# Patient Record
Sex: FEMALE | Race: ASIAN | NOT HISPANIC OR LATINO | URBAN - METROPOLITAN AREA
[De-identification: names, ages, dates, MRNs, and addresses within clinical notes are randomized per-mention and may not be internally consistent; named-entity substitution may affect disease eponyms.]

---

## 2019-02-23 ENCOUNTER — WALK IN (OUTPATIENT)
Dept: URGENT CARE | Age: 39
End: 2019-02-23

## 2019-02-23 ENCOUNTER — IMAGING SERVICES (OUTPATIENT)
Dept: GENERAL RADIOLOGY | Age: 39
End: 2019-02-23
Attending: FAMILY MEDICINE

## 2019-02-23 DIAGNOSIS — M25.551 PAIN OF RIGHT HIP JOINT: Primary | ICD-10-CM

## 2019-02-23 DIAGNOSIS — M25.551 PAIN OF RIGHT HIP JOINT: ICD-10-CM

## 2019-02-23 PROCEDURE — 73502 X-RAY EXAM HIP UNI 2-3 VIEWS: CPT | Performed by: RADIOLOGY

## 2019-02-23 PROCEDURE — 99204 OFFICE O/P NEW MOD 45 MIN: CPT | Performed by: FAMILY MEDICINE

## 2019-02-23 ASSESSMENT — PAIN SCALES - GENERAL: PAINLEVEL: 7-8

## 2019-05-25 ENCOUNTER — OFFICE VISIT (OUTPATIENT)
Dept: OBGYN CLINIC | Facility: CLINIC | Age: 39
End: 2019-05-25
Payer: COMMERCIAL

## 2019-05-25 VITALS
WEIGHT: 135 LBS | RESPIRATION RATE: 14 BRPM | BODY MASS INDEX: 23.05 KG/M2 | SYSTOLIC BLOOD PRESSURE: 92 MMHG | DIASTOLIC BLOOD PRESSURE: 60 MMHG | HEART RATE: 70 BPM | HEIGHT: 64 IN | TEMPERATURE: 98 F

## 2019-05-25 DIAGNOSIS — Z98.890 STATUS POST ELECTIVE ABORTION: ICD-10-CM

## 2019-05-25 DIAGNOSIS — N64.4 BREAST PAIN, RIGHT: ICD-10-CM

## 2019-05-25 DIAGNOSIS — R14.0 ABDOMINAL BLOATING WITH CRAMPS: ICD-10-CM

## 2019-05-25 DIAGNOSIS — Z11.3 SCREEN FOR SEXUALLY TRANSMITTED DISEASES: ICD-10-CM

## 2019-05-25 DIAGNOSIS — R10.9 ABDOMINAL BLOATING WITH CRAMPS: ICD-10-CM

## 2019-05-25 DIAGNOSIS — N64.4 BREAST TENDERNESS IN FEMALE: ICD-10-CM

## 2019-05-25 DIAGNOSIS — N94.89 UTERINE CRAMPING: ICD-10-CM

## 2019-05-25 DIAGNOSIS — R10.2 SUPRAPUBIC PRESSURE: ICD-10-CM

## 2019-05-25 DIAGNOSIS — N92.6 IRREGULAR BLEEDING: Primary | ICD-10-CM

## 2019-05-25 PROCEDURE — 87491 CHLMYD TRACH DNA AMP PROBE: CPT | Performed by: NURSE PRACTITIONER

## 2019-05-25 PROCEDURE — 99204 OFFICE O/P NEW MOD 45 MIN: CPT | Performed by: NURSE PRACTITIONER

## 2019-05-25 PROCEDURE — 81002 URINALYSIS NONAUTO W/O SCOPE: CPT | Performed by: NURSE PRACTITIONER

## 2019-05-25 PROCEDURE — 87660 TRICHOMONAS VAGIN DIR PROBE: CPT | Performed by: NURSE PRACTITIONER

## 2019-05-25 PROCEDURE — 87147 CULTURE TYPE IMMUNOLOGIC: CPT | Performed by: NURSE PRACTITIONER

## 2019-05-25 PROCEDURE — 87510 GARDNER VAG DNA DIR PROBE: CPT | Performed by: NURSE PRACTITIONER

## 2019-05-25 PROCEDURE — 87591 N.GONORRHOEAE DNA AMP PROB: CPT | Performed by: NURSE PRACTITIONER

## 2019-05-25 PROCEDURE — 87077 CULTURE AEROBIC IDENTIFY: CPT | Performed by: NURSE PRACTITIONER

## 2019-05-25 PROCEDURE — 87086 URINE CULTURE/COLONY COUNT: CPT | Performed by: NURSE PRACTITIONER

## 2019-05-25 PROCEDURE — 87480 CANDIDA DNA DIR PROBE: CPT | Performed by: NURSE PRACTITIONER

## 2019-05-25 PROCEDURE — 81025 URINE PREGNANCY TEST: CPT | Performed by: NURSE PRACTITIONER

## 2019-05-25 RX ORDER — MULTIVIT-MIN/IRON FUM/FOLIC AC 7.5 MG-4
1 TABLET ORAL DAILY
COMMUNITY
End: 2019-08-15

## 2019-05-25 NOTE — PROGRESS NOTES
Past 5 months irregular mense since after EAB. Cramping bloating,constipation, breast tenderness. Spouse had vasecetomy. Unsure if after testing was completed.

## 2019-05-25 NOTE — PROGRESS NOTES
GYN H&P     2019  11:51 AM    CC: Patient is here to establish care/pelvic cramping    HPI: Patient is a 35year old  here. Reports she had an EAB (surgical) 5 months ago.  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 Irregular bleeding    2. Suprapubic pressure    3. Breast tenderness in female    4. Breast pain, right    5. Status post elective     6. Uterine cramping    7. Abdominal bloating with cramps    8.  Screen for sexually transmitted diseas

## 2019-05-31 RX ORDER — AMOXICILLIN 500 MG/1
500 CAPSULE ORAL 3 TIMES DAILY
Qty: 21 CAPSULE | Refills: 0 | Status: SHIPPED | OUTPATIENT
Start: 2019-05-31 | End: 2019-06-07

## 2019-08-01 ENCOUNTER — OFFICE VISIT (OUTPATIENT)
Dept: OBGYN CLINIC | Facility: CLINIC | Age: 39
End: 2019-08-01
Payer: COMMERCIAL

## 2019-08-01 VITALS
BODY MASS INDEX: 22.71 KG/M2 | HEIGHT: 64 IN | WEIGHT: 133 LBS | SYSTOLIC BLOOD PRESSURE: 118 MMHG | HEART RATE: 88 BPM | DIASTOLIC BLOOD PRESSURE: 60 MMHG

## 2019-08-01 DIAGNOSIS — Z12.4 PAPANICOLAOU SMEAR FOR CERVICAL CANCER SCREENING: ICD-10-CM

## 2019-08-01 DIAGNOSIS — Z12.31 ENCOUNTER FOR SCREENING MAMMOGRAM FOR MALIGNANT NEOPLASM OF BREAST: ICD-10-CM

## 2019-08-01 DIAGNOSIS — Z01.419 WELL WOMAN EXAM WITH ROUTINE GYNECOLOGICAL EXAM: Primary | ICD-10-CM

## 2019-08-01 PROCEDURE — 88175 CYTOPATH C/V AUTO FLUID REDO: CPT | Performed by: OBSTETRICS & GYNECOLOGY

## 2019-08-01 PROCEDURE — 99396 PREV VISIT EST AGE 40-64: CPT | Performed by: OBSTETRICS & GYNECOLOGY

## 2019-08-01 NOTE — PROGRESS NOTES
Leny Santacruz is a 35year old female  Patient's last menstrual period was 2019 (exact date). Patient presents with:  Wellness Visit: family planning  .      Got it pregnant in February had a termination in December she says she was 8 we control/protection: Vasectomy    Lifestyle      Physical activity:        Days per week: Not on file        Minutes per session: Not on file      Stress: Not on file    Relationships      Social connections:        Talks on phone: Not on file        Gets t denies dysuria, incontinence, abnormal vaginal discharge, vaginal itching  Skin/Breast:  Denies any breast pain, lumps, or discharge. Neurological:  denies headaches, extremity weakness or numbness.       PHYSICAL EXAM:   Constitutional: well developed, w

## 2019-08-05 LAB — LAST PAP RESULT: NORMAL

## 2019-08-07 ENCOUNTER — TELEPHONE (OUTPATIENT)
Dept: OBGYN CLINIC | Facility: CLINIC | Age: 39
End: 2019-08-07

## 2019-08-07 NOTE — TELEPHONE ENCOUNTER
----- Message from Marcia Carvalho sent at 8/7/2019  4:55 PM CDT -----  Returning call re:test results

## 2019-08-08 NOTE — TELEPHONE ENCOUNTER
----- Message from [facility] sent at 8/8/2019  7:49 AM CDT -----  Pt called back (answering service) for test results

## 2019-08-08 NOTE — TELEPHONE ENCOUNTER
Patient aware of normal pap smear results. She was just in for  her Well Woman Visit on 8/1/19. States that she  desires to get pregnant and wants to know if due to her age doctor would put her on something to make it easier to get pregnant.   Patient inform

## 2019-08-08 NOTE — TELEPHONE ENCOUNTER
----- Message from [facility] sent at 8/8/2019  7:49 AM CDT -----  Pt called back (answering service) for test results  See notes for phone encounter 8/7/19  Patient aware of normal pap results but asking if she could  take medication help her get pregn

## 2019-08-08 NOTE — TELEPHONE ENCOUNTER
----- Message from [facility] sent at 8/8/2019  7:49 AM CDT -----  Pt called back (answering service) for test results

## 2019-08-15 ENCOUNTER — OFFICE VISIT (OUTPATIENT)
Dept: OBGYN CLINIC | Facility: CLINIC | Age: 39
End: 2019-08-15
Payer: COMMERCIAL

## 2019-08-15 VITALS
BODY MASS INDEX: 22.53 KG/M2 | SYSTOLIC BLOOD PRESSURE: 102 MMHG | TEMPERATURE: 99 F | HEIGHT: 64 IN | WEIGHT: 132 LBS | DIASTOLIC BLOOD PRESSURE: 60 MMHG

## 2019-08-15 DIAGNOSIS — Z72.51 UNPROTECTED SEXUAL INTERCOURSE: ICD-10-CM

## 2019-08-15 DIAGNOSIS — R10.2 PELVIC CRAMPING: ICD-10-CM

## 2019-08-15 DIAGNOSIS — R52 BODY ACHES: ICD-10-CM

## 2019-08-15 DIAGNOSIS — R35.0 URINARY FREQUENCY: Primary | ICD-10-CM

## 2019-08-15 DIAGNOSIS — M54.50 ACUTE MIDLINE LOW BACK PAIN WITHOUT SCIATICA: ICD-10-CM

## 2019-08-15 PROBLEM — Z01.419 WELL WOMAN EXAM WITH ROUTINE GYNECOLOGICAL EXAM: Status: RESOLVED | Noted: 2019-08-01 | Resolved: 2019-08-15

## 2019-08-15 LAB
APPEARANCE: CLEAR
CONTROL LINE PRESENT WITH A CLEAR BACKGROUND (YES/NO): YES YES/NO
MULTISTIX LOT#: NORMAL NUMERIC
PH, URINE: 7 (ref 4.5–8)
PREGNANCY TEST, URINE: NEGATIVE
SPECIFIC GRAVITY: 1.02 (ref 1–1.03)
URINE-COLOR: YELLOW
UROBILINOGEN,SEMI-QN: 0.2 MG/DL (ref 0–1.9)

## 2019-08-15 PROCEDURE — 87086 URINE CULTURE/COLONY COUNT: CPT | Performed by: PHYSICIAN ASSISTANT

## 2019-08-15 PROCEDURE — 81025 URINE PREGNANCY TEST: CPT | Performed by: OBSTETRICS & GYNECOLOGY

## 2019-08-15 PROCEDURE — 99214 OFFICE O/P EST MOD 30 MIN: CPT | Performed by: OBSTETRICS & GYNECOLOGY

## 2019-08-15 PROCEDURE — 81002 URINALYSIS NONAUTO W/O SCOPE: CPT | Performed by: OBSTETRICS & GYNECOLOGY

## 2019-08-15 NOTE — PROGRESS NOTES
[facility]  Obstetrics and Gynecology   History & Physical    Chief complaint: Patient presents with:  Gyn Problem: cramping, body aches, body chills, pelvic pain mainly on RT side, No burning senation, frequent urination      Subjective:     HPI last menstrual period was 07/26/2019 (exact date). Meds:    Current Outpatient Medications on File Prior to Visit:  Prenatal Vit-Fe Sulfate-FA (PRENATAL VITAMIN OR) Take by mouth.  Disp:  Rfl:      No current facility-administered medications on file sherita Concerns:         Service: Not Asked        Blood Transfusions: Not Asked        Caffeine Concern: No        Occupational Exposure: Not Asked        Hobby Hazards: Not Asked        Sleep Concern: Not Asked        Stress Concern: Not Asked        We Conjunctivae and EOM are normal.   Cardiovascular: Normal rate and regular rhythm. No murmur heard. Pulmonary/Chest: Breath sounds normal. She is in respiratory distress. Abdominal: Soft. She exhibits no distension and no mass.  There is no tenderness Protein Urine      Negative/Trace mg/dL  neg   Urobilinogen Urine      0.0 - 1.9 mg/dL  0.2   Nitrite Urine      Negative  neg   Leukocyte Esterase Urine      Negative  trace   APPEARANCE      Clear  clear   Color Urine      Yellow  yellow   Multistix Lo the ruptured ovarian cyst. Can recheck UCG in about 2-3 weeks for reassurance.    Will check pelvic US  RTC PRN    Leroy Oconnell MD  [facility] - OBGYN

## 2019-09-13 ENCOUNTER — TELEPHONE (OUTPATIENT)
Dept: OBGYN CLINIC | Facility: CLINIC | Age: 39
End: 2019-09-13

## 2019-09-18 NOTE — TELEPHONE ENCOUNTER
Patient is looking for advise on next step, she is trying to conceive. Patient instructed to schedule appointment with provider for consultation. Patient verbalizes understanding.

## 2020-01-30 ENCOUNTER — HOSPITAL (OUTPATIENT)
Dept: OTHER | Age: 40
End: 2020-01-30
Attending: OBSTETRICS & GYNECOLOGY

## 2020-01-30 PROCEDURE — X1094 NO CHARGE VISIT: HCPCS | Performed by: OBSTETRICS & GYNECOLOGY

## 2020-01-30 PROCEDURE — 76811 OB US DETAILED SNGL FETUS: CPT | Performed by: OBSTETRICS & GYNECOLOGY

## 2021-05-26 VITALS
HEART RATE: 80 BPM | RESPIRATION RATE: 16 BRPM | SYSTOLIC BLOOD PRESSURE: 90 MMHG | TEMPERATURE: 99.4 F | DIASTOLIC BLOOD PRESSURE: 50 MMHG

## 2022-10-31 ENCOUNTER — TELEPHONE (OUTPATIENT)
Dept: UROLOGY | Facility: CLINIC | Age: 42
End: 2022-10-31

## 2023-04-07 NOTE — PROGRESS NOTES
MUSCULOSKELETAL AND PELVIC FLOOR EVALUATION:     Diagnosis:   Stress incontinence (N39.3)  Urge urinary incontinence (N39.41)  Pelvic floor weakness (L79.83)     Referring Provider: Pallavi Maldonado  Date of Evaluation:    2023    Precautions:  None Next MD visit:   none scheduled  Date of Surgery: n/a     PATIENT SUMMARY   Clement Lee is a 36year old female who presents to therapy today with complaints of urinary inconinence after giving birth to her youngest child ~3 years ago. She had her first child 21 years ago and did not reportedly experience any issues afterwards; however, her youngest was born 2020 and she started experiencing her symptoms shortly after. She has noticed some improvement in leakage since giving birth, but it still occurs with coughing, sneezing, laughing, jumping and running. She reports that she can no longer comfortably participate in fitness classes due to leakage. She reports using 2+ pads per day. She also reports more recent onset of urge incontinence. She often experiences urgency and then leaks before she is able to make it to the restroom. Reports drinking 32-44 ounces of water per day. Reports urinating 1x every two hours in the day and usually 0x at night. Denies issues with constipation or straining; reports going 1x/day. Current symptoms include: urgency/frequency and leakage    Pt describes pain level: current 0/10, best 0/10, worst 0/10. 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 PFDI-20: Not completed    Sobia Thibodeaux describes prior level of function as having no leakage. Pt goals include to resolve the urinary leakage so she can return to fitness classes. Past medical history was reviewed with Mary. She  has no past medical history on file.       URINARY HABITS  Types of symptoms: stress incontinence and urge incontinence  Events associated with the onset of urinary complaints: childbirth  Abdominal/Vaginal Pressure complaints: no  Urinary Frequency: 1x every 2 hours  Urine Stream: WNL  Amount: normal to diminished  Leaking occurs: with coughing, sneezing, laughing, jumping, running  Episodes of Leakage: 2+ times per day  Amount of leakage: min to mod  Pad use: yes  Pad Change frequency: 2-3x/day  Nocturia: no  Fluid Intake: variable  Bladder irritants: NA  Urine Stop test: unable  Post void dribble: no  Hovering: sometimes  Empty bladder just in case: yes  Do you ever leak urine without knowing it? sometimes    BOWEL HABITS  Types of symptoms: other WNL   Frequency of bowel movements: WNL  Stool consistency: [place] Stool Scale: 4  Do you strain with defecation: No   Laxative use: No    SEXUAL HEALTH STATUS  Marinoff Scale: 0  History of Sexual Abuse: NA  Sexual [place] Status: active  Pain with initial and/or deep penetration: no  Pain with pelvic exam/tampon use: no    ASSESSMENT  Betsy Morse presents to physical therapy evaluation with primary c/o urinary leakage. The results of the objective tests and measures show impaired TvA recruitment, suboptimal LE strength, absent involuntary PFM contraction ability/cough reflex, and markedly impaired PFM contraction ability as only trace volitional contraction was observed with PERF score. Functional deficits include but are not limited to impaired QoL and ability to engage in childcare tasks, exercise, and higher impact activities due to leakage. Signs and symptoms are consistent with diagnosis of mixed urinary incontinence. Pt and PT discussed evaluation findings, pathology, POC and HEP. Pt voiced understanding and performs HEP correctly without reported pain. Skilled Pelvic Physical Therapy is medically necessary to address the above impairments and reach functional goals. OBJECTIVE:   Posture:  WNL  Pelvic Alignment: WNL  Deep Tendon Reflexes:  Patella: R 2+, L 2+;        Achilles: R 2+, L 2+  Gait: pt ambulates on level ground with normal mechanics. External Palpation: unremarkable  Scars (location/surgery): NA  Abdominal Wall Integrity: decreased tone    Range Of Motion  Lumbar AROM screen: WNL  LE AROM screen: grossly WNL     Strength (MMT) 5/5 GASTON LE except 3+/5 hip extensors  Transverse Abdominis: 3/5 -- cues required for proper activation     Flexibility Summary: WNL GASTON LE      Informed consent for internal pelvic evaluation given: Yes    External Observation:   Voluntary contraction: absent   Voluntary relaxation: present  Involuntary contraction: absent  Involuntary relaxation: present    Mons pubis: WNL  Labia majora: WNL  Labia minora: WNL  Urethral meatus: WNL  Introitus: WNL  Perineal body: WNL      Internal Examination     Pelvic Floor Muscle strength: (PERF= Power/Endurance/Reps/Fast) MMT: 1/1/1/1 - only trace contraction ability   External Anal Sphincter: NT  Accessory Muscle Use: gluteals, adductors, significant    Tissue Laxity Test:  Anterior Wall: WNL  Posterior Wall: WNL  Apical: WNL    Eccentric lengthening contraction: impaired  Bearing down Valsalva maneuver (2-3x): WNL    Internal Palpation: WNL     Cough reflex: impaired     Today's Treatment and Response:   Patient education was provided on objective findings of external and internal evaluation and expectations with treatment outcomes. Educated on pelvic anatomy and function with diagrams and pelvic model, bladder normatives, adequate hydration levels, proper toileting posture, stress/urge urinary incontinence strategies, coordination of diaphragmatic breathing and pelvic floor contraction  and knack/pelvic muscle brace    Neuro:   Extensive education provided on pelvic floor and bladder anatomy and function and strategies discussed to decrease urinary urgency/frequency.  Discussed strategies for decreasing void frequency including PFM quick flick contractions and distraction techniques to re-establish optimal brain/bladder connection via erica's loop 3. Discussed ultimate goal of decreasing frequency to 1x every 2.5-3 hours. Provided 3 handouts including bladder retraining, urge deferment, HEP. 25 mins. Elevators x 10  Endurance x 10  Quick flicks 5 x 5    Manual:   Internal exam performed and cues provided on proper PFM contraction, relaxation, and bearing down. Cued in proper endurance and fast-twitch contractions, as well as elevator contractions. Cued to minimize extrapelvic compensation. 10 mins. Charges: PT Eval Moderate Complexity, neuro x 1, manual x 1      Total Timed Treatment: 35 min     Total Treatment Time: 50 min     Based on clinical rationale and outcome measures, this evaluation involved Moderate Complexity decision making due to 1-2 personal factors/comorbidities, 3 body structures involved/activity limitations, and evolving symptoms including stress urinary incontinence and urge urinary incontinence  PLAN OF CARE:    Goals: (to be met in 10 visits)  Patient will improve PERF score to at least 2/5/5//5 for improved pelvic floor function and pelvic organ support. Patient will demonstrate ability to sustain a kegel contraction with a sit<>stand transfer x 10 reps without urinary leakage for return to ADLs without incontinence symptoms. Patient will demonstrate ability to perform 10 jumping jacks without leakage for return to exercising without ANTIONETTE symptoms. Patient will report ability to postpone urination for at least 30 mins after initial urge presents for return to community outings without fear of UI symptoms. Patient will report use of KNACK contraction at least 75% of the time to re-establish cough reflex and mitigate urinary leakage with increases in intra-abdominal pressure. Patient will report adherence to HEP for continued exercise benefits  following cessation of PT. Frequency / Duration: Patient will be seen for 1-2 x/week or a total of 10 visits over a 90 day period. Treatment will include: Manual Therapy, Neuromuscular Re-education, Therapeutic Activities, Therapeutic Exercise, Home Exercise Program instruction and Modalities to include: biofeedback     Education or treatment limitation: None  Rehab Potential:good      Patient/Family/Caregiver was advised of these findings, precautions, and treatment options and has agreed to actively participate in planning and for this course of care. Thank you for your referral. Please co-sign or sign and return this letter via fax as soon as possible to [phone]. If you have any questions, please contact me at Dept: [phone]    Sincerely,  Electronically signed by therapist: Adria Engel PT  [de-identified] certification required: Yes  I certify the need for these services furnished under this plan of treatment and while under my care.     X___________________________________________________ Date____________________    Certification From: 1/5/3843  To:7/6/2023

## 2023-07-24 NOTE — ED QUICK NOTES
Rounding completed. Plan of care reviewed with patient and/or family. Patient's vitals updated; as per documentation. Patient's pain reduced pain w/recent medication; MD updated. Patient awaiting CT results. Patient requesting water, reattached 0.9 1000Bolus; asked to maintain NPO till CT results. Reattached pt to vitals and line.

## 2023-07-24 NOTE — ED QUICK NOTES
Pts Bps trending low; pt states she has a hx of hypotension; switched arms and had improvement of BP; monitoring Q15; MD updated

## 2023-07-24 NOTE — ED INITIAL ASSESSMENT (HPI)
States she gets these \"attacks\" heartburn chest pains and radiates to back. Pt states episodes are intermittent.

## 2023-07-25 NOTE — ED QUICK NOTES
Bedside patient report given to Aidee Savage [facility].  Waiting on CT result

## 2023-07-27 NOTE — PATIENT INSTRUCTIONS
Plan:  - repeat labs: TSH, free T4, total T3, TSI (Checks for autoimmune hyperthyroidism etiology)  - complete thyroid ultrasound  - you can take ibuprofen 600mg every 6-8 hours with food for pain as needed        [facility] - [facility]  Office phone number: [phone]  Fax number: [phone]

## 2023-07-27 NOTE — PROGRESS NOTES
Endocrinology Clinic Note    Name: Cherylene Rotter    Date: 7/27/2023    HISTORY OF PRESENT ILLNESS   Cherylene Rotter is a 39year old female with no significant PMHx who presents for consultation for goiter. Initial HPI consult in July 2023  Thyroid hx:  (-) Fhx of thyroid disease     July 2023 labs  TSH - 0.732    Presented to [facility] ER 7/24 with chest pain and shortness of breath  Ddimer was positive; so CT scan was completed  Goiter noted on CT scan w/ contrast  Pt notes some vague sx's (below) that she felt on and off over last year  Menses are regular, LMP 7/3/24  Past few months noting more watery eyes, vision changes (trouble reading things), saw eye doctor yesterday   Breathing has normalized  Denies known sick contacts, no known recent URI    Pt endorses: anxiety on and off, fatigue/weakness, temperature intolerance, tremor, and vision changes recent IV contrast for imaging, feeling like something stuck in throat (for the last year on and off)  Pt denies: tremor, palpitations, hair loss, hyperdefectation, unexplained weight loss, and changes in menses recent illness, weight loss medication use, amiodarone use, and biotin use    REVIEW OF SYSTEMS  Ten point review of systems has been performed and is otherwise negative and/or non-contributory, except as described above. Medications:     Current Outpatient Medications:     pantoprazole 40 MG Oral Tab EC, Take 1 tablet (40 mg total) by mouth daily. , Disp: 30 tablet, Rfl: 0    Prenatal Vit-Fe Sulfate-FA (PRENATAL VITAMIN OR), Take by mouth., Disp: , Rfl:      Allergies:   No Known Allergies    Social History:   Social History    Socioeconomic History      Marital status:     Tobacco Use      Smoking status: Never      Smokeless tobacco: Never    Vaping Use      Vaping Use: Never used    Substance and Sexual Activity      Alcohol use: Yes        Comment: socially      Drug use: Never      Sexual activity: Yes        Partners: Female        Birth control/protection: Vasectomy    Other Topics      Concerns:        Caffeine Concern: No        Exercise: Yes        Seat Belt: Yes      Medical History:   No past medical history on file. Surgical history:   Past Surgical History:   Procedure Laterality Date    OTHER SURGICAL HISTORY      ASD repair at age 8       PHYSICAL EXAMINATION:  BP 98/64   Pulse 62   LMP 07/03/2023 (Approximate)   SpO2 100%     CONSTITUTIONAL:  awake, alert, cooperative, no apparent distress, and appears stated age  PSYCH: normal affect  EYES:  No proptosis,  conjunctiva normal  ENT:  Normocephalic, atraumatic  NECK:  Diffusely enlarged thyroid, larger on R > L, tenderness on palpation of R lobe   LUNGS: breathing comfortably  CARDIOVASCULAR:  regular rate   ABDOMEN:  soft  SKIN:  no rashes and no lesions  EXTREMITIES: no edema    Labs:  Lab Results   Component Value Date    T4F 1.1 07/27/2023    TSH 0.453 07/27/2023      Recent Labs     07/24/23  1420 07/27/23  1611   T4F  --  1.1   TSH 0.732 0.453        Imaging:  No results found. ASSESSMENT/PLAN:  (E04.9) Goiter  (primary encounter diagnosis)  Plan: TSH+FREE T4, TRIIODOTHYRONINE (T3) TOTAL,         THYROID STIMULATING IMMUNOGLOB, US THYROID         ([id number]), CANCELED: US THYROID ([id number])      39year old female with no pertinent PMHx presenting w/ goiter incidentally found on CT w/ contrast; correlating TSH was normal. Will repeat TFTs, add on thyroid ultrasound. - repeat TFTs w/ fT4, total T3  - complete thyroid ultrasound  - can take ibuprofen 600mg q6-8h for thyroid tenderness PRN  - Pathophysiology of condition, goals of therapy,  d/w pt in detail   - clinical significance of thyroid testing discussed  - thyroid is non-nodular on exam  - no opthalmopathy on exam  - treat underlying hypoTH or hyperTH first. Will monitor nodules as they appear.  If develop compressive sx, can pursue surgery, but otherwise a goiter itself is not harmful  - reviewed w/ collab MD Guillory Joyce Cordova    Return in about 1 month (around 8/27/2023) for thyroid follow up. A total of 40 minutes was spent today on obtaining history, reviewing pertinent labs, evaluating patient, providing multiple treatment options, reinforcing diet/exercise and compliance, and completing documentation.      7/27/2023  MADISON Verduzco

## 2023-07-28 NOTE — TELEPHONE ENCOUNTER
RN phoned patient per Nithya WALLACE note\"Call pt- no steroids warranted, the goiter is not from thyroiditis, no indication for steroids, would not resolve. Ibuprofen will help with tenderness  ENT referral placed- can provide her with their phone number to schedule\"    Referral is placed for [facility] ENT phone number: [phone]    While on phone patient received text with new results. On our end they read as \"active in process\"     Notified patient that Emery WALLACE has gone home. Will call her one Monday once Nithya WALLACE has has time to review. Patient verbalized understanding.

## 2023-07-28 NOTE — TELEPHONE ENCOUNTER
RN phoned patient per Nithya WALLACE lab results\"- thyroid ultrasound indicates a nodule on the R lobe, given its size requires further follow up with a biopsy, this is done by ENT - please have her see [facility] E[facility] for FNA. The other nodules found are small and do not require biopsy   - since her goiter is accompanied by repeat normal TFTs, will add on an anti TPO to eval for Hashimoto's as an etiology for the goiter itself   - the thyroid hormone testing we did was all normal, so from a biochemical standpoint there is nothing abnormal with thyroid hormone production\"     Patient verbalized understanding that she will hear from us when additional labs result. Patient questions if steroids are still an option- reviewed that steroids could be used if patients labs showed hyperthyroidism and Nithya WALLACE was thinking thyroiditis. For now we are waiting on additional labs to see full thyroid picture. Instructed that next step would be to see ENT for fine needle aspiration of nodule found on R lobe. Patient requests referral.    Routed to Emery WALLACE.

## 2023-07-28 NOTE — TELEPHONE ENCOUNTER
Received call from Rui Mei at [facility] stating they have scan results ready. Provided Fax number to have sent to our office. Will await fax.

## 2023-07-28 NOTE — TELEPHONE ENCOUNTER
RN phoned lab at [phone] to confirm if TPO antibody lab test could be added to patients TFTs draw. Spoke with Nomi Lopez who confirms test can be added \"it should be good for two days. \"

## 2023-07-31 NOTE — TELEPHONE ENCOUNTER
Labs reviewed- see result note, biochemically all thyroid labs are stable  Pt can call [facility] to schedule- to clarify, the ENT appointment will be for them to evaluate the nodule and make recommendations on what next steps, if they agree on biopsy then that is done thru radiology but they will make that determination

## 2023-07-31 NOTE — TELEPHONE ENCOUNTER
Spoke with patient on telephone, reviewed below. Patient verbalized understanding. She has appointment scheduled with ENT Dr. Alexandra Baker on 8/2. Closing this encounter.

## 2023-08-02 NOTE — PROGRESS NOTES
Marcin Caceres is a 39year old female. Patient presents with:  Thyroid Nodule    HPI:   She presented to the emergency room with chest pain. She underwent a CT scan which showed thyroid nodules. She then underwent an ultrasound. She has no family history of thyroid cancer. She is not on thyroid medications. She was placed on a PPI but the chest pain has persisted. She also has a history of some sore throat associated difficulty swallowing and some neck tenderness. Current Outpatient Medications   Medication Sig Dispense Refill    pantoprazole 40 MG Oral Tab EC Take 1 tablet (40 mg total) by mouth daily. 30 tablet 0    Prenatal Vit-Fe Sulfate-FA (PRENATAL VITAMIN OR) Take by mouth. History reviewed. No pertinent past medical history. Social History:  Social History     Socioeconomic History    Marital status:    Tobacco Use    Smoking status: Never    Smokeless tobacco: Never   Vaping Use    Vaping Use: Never used   Substance and Sexual Activity    Alcohol use: Yes     Comment: socially    Drug use: Never    Sexual activity: Yes     Partners: Female     Birth control/protection: Vasectomy   Other Topics Concern    Caffeine Concern No    Exercise Yes    Seat Belt Yes      Past Surgical History:   Procedure Laterality Date    OTHER SURGICAL HISTORY      ASD repair at age 8         REVIEW OF SYSTEMS:   GENERAL HEALTH: feels well otherwise  GENERAL : denies fever, chills, sweats, weight loss, weight gain  SKIN: denies any unusual skin lesions or rashes  RESPIRATORY: denies shortness of breath with exertion  NEURO: denies headaches    EXAM:   LMP 07/03/2023 (Approximate)     System Findings Details   Constitutional  Overall appearance - Normal.   Psychiatric  Orientation - Oriented to time, place, person & situation. Appropriate mood and affect.    Head/Face  Facial features -- Normal. Skull - Normal.   Eyes  Pupils equal ,round ,react to light and accomidate   Ears, Nose, Throat, Neck  Ears clear nose clear oropharynx clear neck reveals a large thyroid gland which is tender. Neurological  Memory - Normal. Cranial nerves - Cranial nerves II through XII grossly intact. Lymph Detail  Submental. Submandibular. Anterior cervical. Posterior cervical. Supraclavicular. Flexible Laryngoscopy Procedure Note (65399)    Due to inability for adequate examination of the larynx and need for magnification to perform the examination, endoscopy was performed. Risks and benefits were discussed with patient/family and they have given verbal consent to proceed. Pre-operative Diagnosis: Multiple thyroid nodules  (primary encounter diagnosis)  Chronic sore throat    Post-operative Diagnosis: Same    Procedure: Diagnostic flexible fiberoptic laryngoscopy    Anesthesia: topical lidocaine    Surgeon: Lesley Anderson MD    EBL: 0cc    Procedure Detail & Findings: Base of tongue epiglottis and true vocal cords are normal with normal mobility. Condition: Stable    Complications: Patient tolerated the procedure well with no immediate complications. Jasiel Dias MD    ASSESSMENT AND PLAN:   1. Multiple thyroid nodules  CT scan and thyroid ultrasound was reviewed. This shows multiple thyroid nodules. The largest is 2.5 cm. I agree that biopsy needs to be performed of this nodule. - US FNA THYROID, GUIDE INCLD, FIRST LESION (CPT=10005); Future    2. Chronic sore throat  She will continue on PPI as reflux disease could be causing her pains. Her TPO antibodies are normal but I have seen low-level thyroiditis cause these type of symptoms. I agree that she can take anti-inflammatories as needed. She is having some dysphagia and some difficulty breathing when she lies flat. This has been going on for a few months. We will monitor the symptoms and await results of upcoming biopsy. The patient indicates understanding of these issues and agrees to the plan. No follow-ups on file.     Lesley Anderson MD  8/2/2023  9:14 AM

## 2023-08-08 NOTE — TELEPHONE ENCOUNTER
Patient inquiring about what to do about pain in thyroid nodules, states she has FNA in 2 days but would like a callback for advice about pain.

## 2023-08-10 NOTE — PROCEDURES
[facility]  Procedure Note    Genesis Gonzalez Patient Status:  Outpatient    1980 MRN [id number]   [facility] ULTRASOUND Attending Elise Mcgrath MD    Day # 0 PCP None Pcp     Procedure: US thyroid FNA    Pre-Procedure Diagnosis:  Nodules    Post-Procedure Diagnosis: Same    Anesthesia:  Local    Findings:  5 x 25g FNA of R isthmus lesion    Specimens: As above    Blood Loss:  Minimal    Tourniquet Time: None  Complications:  None  Drains:  None    Secondary Diagnosis:  None    Chelsea Mchugh MD  8/10/2023

## 2023-08-10 NOTE — PROGRESS NOTES
She is still having some pain. Neck gets tender. She is tried anti-inflammatories. She has her thyroid needle biopsy today. I will place her on prednisone 10 mg daily for 10 days. Will await results of biopsy.

## 2023-08-16 NOTE — TELEPHONE ENCOUNTER
Pt called back in regards to Vm left by Rn earlier today. Pt understands that the results are benign, but was looking for a more detailed explanation as well as what next steps may look like as she still has inflamed nodules. Please advise.

## 2023-08-18 NOTE — TELEPHONE ENCOUNTER
Can let patient know that I have received her information from Dr. Slim Underwood re: her thyroid nodule   She can come in for f/u visit on neck pain, we can discuss if any other lab work needs to be done     RN phoned to no answer and LVM per Nithya WALLACE note: patient is to call back and make f/u to discuss further testing for thyroid. [facility] left for patient asking for call back to set up follow up appointment.

## 2023-08-18 NOTE — PROGRESS NOTES
Jorge Shannon is a 39year old female. Patient presents with:  Thyroid Nodule    HPI:   She has a history of right-sided neck pain. Is persisted. It radiates from her thyroid up superiorly. Her sore throat seems to be better. She feels like her chest pain is better on reflux medication. Her neck is tender. She has been on low-dose prednisone with limited relief. REVIEW OF SYSTEMS:   GENERAL HEALTH: feels well otherwise  GENERAL : denies fever, chills, sweats, weight loss, weight gain  SKIN: denies any unusual skin lesions or rashes  RESPIRATORY: denies shortness of breath with exertion  NEURO: denies headaches    EXAM:   LMP 07/03/2023 (Approximate)     System Findings Details   Constitutional  Overall appearance - Normal.   Psychiatric  Orientation - Oriented to time, place, person & situation. Appropriate mood and affect. Head/Face  Facial features -- Normal. Skull - Normal.   Eyes  Pupils equal ,round ,react to light and accomidate   Ears, Nose, Throat, Neck  Ears clear no fluid nose clear oral cavity clear oropharynx clear neck reveals some swelling over the thyroid on the right-hand side. It is tender. This is the most tender area of her neck. Neurological  Memory - Normal. Cranial nerves - Cranial nerves II through XII grossly intact. Lymph Detail  Submental. Submandibular. Anterior cervical. Posterior cervical. Supraclavicular. ASSESSMENT AND PLAN:   1. Multiple thyroid nodules  She went ultrasound-guided biopsy of the largest nodule on the right side and this is negative for malignancy. - MRI NECK (W+WO)(CPT=70543); Future    2. Neck pain  I reviewed the  ct angiogram of the chest which does show the thyroid region. This does show an enlarged thyroid with nodules otherwise negative. Given the persistent pain I want her to undergo an MRI scan of the neck so we may see all of the neck. I am still concerned that the thyroid is the cause with possible low-grade thyroiditis.   She will continue on prednisone a few more days and may try some Lugol's solution. She will also talk with endocrinology about their opinion.  - MRI NECK (W+WO)(CPT=70543); Future      The patient indicates understanding of these issues and agrees to the plan. No follow-ups on file.     Alonzo Almodovar MD  8/18/2023  11:37 AM

## 2023-08-21 NOTE — TELEPHONE ENCOUNTER
Attempted to phone patient, no answer, left vm to call back. Wote message sent requesting a call back.

## 2023-08-23 NOTE — PROGRESS NOTES
Endocrinology Clinic Note    Name: Chaisdy Rivas    Date: 8/23/2023    HISTORY OF PRESENT ILLNESS   Chasidy Rivas is a 39year old female with no significant PMHx who presents for consultation for goiter. Initial HPI consult in July 2023  Thyroid hx:  (-) Fhx of thyroid disease     July 2023 labs  TSH - 0.732    Presented to [facility] ER 7/24 with chest pain and shortness of breath  Ddimer was positive; so CT scan was completed  Goiter noted on CT scan w/ contrast  Pt notes some vague sx's (below) that she felt on and off over last year  Menses are regular, LMP 7/3/24  Past few months noting more watery eyes, vision changes (trouble reading things), saw eye doctor yesterday   Breathing has normalized  Denies known sick contacts, no known recent URI    Pt endorses: anxiety on and off, fatigue/weakness, temperature intolerance, tremor, and vision changes recent IV contrast for imaging, feeling like something stuck in throat (for the last year on and off)  Pt denies: tremor, palpitations, hair loss, hyperdefectation, unexplained weight loss, and changes in menses recent illness, weight loss medication use, amiodarone use, and biotin use    Interim hx:  August 2023:   July 2023 repeat TFTs:   TSH: 0.453  Free T4: 1.1  Total T3: 89  antiTPO, antiTG and TSI= negative    Seen by ENT for TR3 nodule on R isthmus; completed FNA; benign   Feeling okay overall but w/ incr'd fatigue  Pending neck MRI through ENT    REVIEW OF SYSTEMS  Ten point review of systems has been performed and is otherwise negative and/or non-contributory, except as described above. Medications:     Current Outpatient Medications:     Iodine Strong, Lugol's, Does not apply Solution, Take 0.2 mL by mouth 3 (three) times daily. For 2 weeks, Disp: 10 mL, Rfl: 0    predniSONE 10 MG Oral Tab, Take 1 tablet (10 mg total) by mouth daily. , Disp: 7 tablet, Rfl: 0    predniSONE 10 MG Oral Tab, Take 1 tablet (10 mg total) by mouth daily. , Disp: 10 tablet, Rfl: 0    pantoprazole 40 MG Oral Tab EC, Take 1 tablet (40 mg total) by mouth daily. , Disp: 30 tablet, Rfl: 0    Prenatal Vit-Fe Sulfate-FA (PRENATAL VITAMIN OR), Take by mouth., Disp: , Rfl:      Allergies:   No Known Allergies    Social History:   Social History    Socioeconomic History      Marital status:     Tobacco Use      Smoking status: Never      Smokeless tobacco: Never    Vaping Use      Vaping Use: Never used    Substance and Sexual Activity      Alcohol use: Yes        Comment: socially      Drug use: Never      Sexual activity: Yes        Partners: Female        Birth control/protection: Vasectomy    Other Topics      Concerns:        Caffeine Concern: No        Exercise: Yes        Seat Belt: Yes      Medical History:   No past medical history on file. Surgical history:   Past Surgical History:   Procedure Laterality Date    OTHER SURGICAL HISTORY      ASD repair at age 8       PHYSICAL EXAMINATION:  BP 98/60   Pulse 71   LMP 2023 (Approximate)   SpO2 96%     CONSTITUTIONAL:  awake, alert, cooperative, no apparent distress, and appears stated age  PSYCH: normal affect  EYES:  No proptosis,  conjunctiva normal  ENT:  Normocephalic, atraumatic  NECK:  Diffusely enlarged thyroid, larger on R > L, tenderness localized to R lobe  LUNGS: breathing comfortably  CARDIOVASCULAR:  regular rate   ABDOMEN:  soft  SKIN:  no rashes and no lesions  EXTREMITIES: no edema    Labs:  Lab Results   Component Value Date    T4F 1.1 2023    TSH 0.453 2023      Recent Labs     23  1420 23  1611   T4F  --  1.1   TSH 0.732 0.453        Imagin2023  PROCEDURE:  US THYROID (CPT=76536)     COMPARISON:  None. INDICATIONS:  E04.9 Goiter     TECHNIQUE:  High-resolution ultrasound of the thyroid gland was performed.      PATIENT STATED HISTORY: (As transcribed by Technologist)  Patient stated that the front of her neck is tender more on the right than the left and the left hurts when she eats. Goiter. FINDINGS:       MEASUREMENTS:  RIGHT LOBE:  7.9 x 2.5 x 2.7 cm  LEFT LOBE:  7.6 x 2.8 x 2.8 cm  ISTHMUS:  1 cm     NODULES:    1. Right thyroid lobe mid gland there is a 1.3 x 0.7 x 1.5 cm nodule. This nodule is composed of multiple small cysts consistent with a spongiform nodule. This is a benign TR 1 finding. 2. Junction right thyroid lobe and isthmus there is a 2.5 x 1.7 x 3 cm nodule. Composition is solid. Echogenicity is hyperechoic. Shape is wider than tall. Margins are smooth. There are no echogenic foci. This is a TR 3 nodule. 3. Junction right thyroid lobe and isthmus near upper pole there is a 1.2 x 0.8 x 1.3 cm nodule. This nodule appears to be composed of multiple small cystic spaces and is consistent with a benign TR 1 spongiform nodule. 4. Left thyroid lobe mid gland there is a 1.9 x 1.3 x 1.6 cm nodule. This nodule also appears to be composed of multiple small cystic spaces and is a benign TR 1 spongiform nodule. OTHER:  None. Impression   CONCLUSION:  Multiple thyroid nodules are described above with TI-RADS classifications given. Additional evaluation with fine-needle aspiration of nodule 2 is recommended. ASSESSMENT/PLAN:  (E04.2) Multinodular goiter  (primary encounter diagnosis)  Plan: TSH+FREE T4, TRIIODOTHYRONINE (T3) TOTAL    39year old female with no pertinent PMHx, thyroid ultrasound revealed multinodular goiter with large R nodule on the isthmus; TR3, 2.5 x 1.7 x 3 cm, FNA revealed benign cytology. Still with quite significant pain/tenderness localized to R sided isthmus nodule. Given the localization of the pain, less likely thyroiditis contributing to pain; will f/u with MRI.      - today can repeat TSH, free T4 and total t3 given pt's continued fatigue and thyroid tenderness; previously stable  - pending neck MRI with ENT  - Pathophysiology of condition, goals of therapy,  d/w pt in detail   - clinical significance of thyroid testing discussed  - reviewed w/ collab MD Dr. Pablito Garcia    Return in about 6 months (around 2/23/2024) for thyroid follow up.    8/23/2023  MADISON Murillo

## 2024-01-02 NOTE — TELEPHONE ENCOUNTER
From: Mary Batista  To: Lucila Brand  Sent: 1/2/2024 10:59 AM CST  Subject: Test Thyroid hormone     Gabriel Gaytan,    I would like to follow up and get myself tested again.    I have been feeling sick and want to make sure my thyroid levels are all normal.     Can you please order them for me?     Thank you

## 2024-01-02 NOTE — TELEPHONE ENCOUNTER
Yes, just TSH and fT4. She can move up her appt and get labs done sooner; can re-sign if dated for later time

## 2024-01-02 NOTE — TELEPHONE ENCOUNTER
Patient has labs in system ordered 8/23/23 for a TSH and Free T4.    Patient has upcoming appointment scheduled with Nithya WALLACE on 2/23/24.

## 2024-01-05 NOTE — TELEPHONE ENCOUNTER
There is no indication to test again, if symptomatic would f/u with PCP, thyroid testing does not explain symptoms

## 2024-01-05 NOTE — TELEPHONE ENCOUNTER
Patient had Thyroid Stimulating Immunoglob and Thyroid Peroxidase/Thyroglobulin Ab done 7/27/23.    Component      Latest Ref Rng 7/27/2023   ANTI-THYROGLOBULIN      <60 U/mL <15    ANTI-THYROPEROXIDASE      <60 U/mL <28    Thy Stim Immuno      0.00 - 0.55 IU/L <0.10          Routing on for Nithya WALLACE to review.

## 2024-09-05 NOTE — ED INITIAL ASSESSMENT (HPI)
PT states that she was directed to come to the ED from immediate care due to DVT concerns. PT states that she also has been experiencing chest pain \"on and off\" for several weeks.  PT states that she is currently experiencing chest pain and severe left leg pain.

## 2024-09-06 NOTE — ED PROVIDER NOTES
Patient Seen in: [facility] Emergency Department      History     Chief Complaint   Patient presents with    Deep Vein Thrombosis    Chest Pain     Stated Complaint: leg pain r/o DVT    Subjective:   HPI    This is a 44-year-old woman, denies any past medical history here for evaluation of pain in the back of her left leg around the Achilles tendon.  Symptoms ongoing for about the past week.  Has been able to bear weight and ambulate denies any trauma or injury.  States yesterday was able to do the elliptical machine for about an hour, states it seemed to aggravate the symptoms little bit more.  She also reports intermittent chest discomfort, described as reflux symptoms, states these are longstanding, denies any exertional chest pain denies any recent change in her exercise tolerance no vomiting fevers, difficulty breathing any other complaints or concerns known immediate family history of cardiac disease, or any other complaints or concerns.    Objective:   No pertinent past medical history.            No pertinent past surgical history.              No pertinent social history.            Review of Systems    Positive for stated Chief Complaint: Deep Vein Thrombosis and Chest Pain    Other systems are as noted in HPI.  Constitutional and vital signs reviewed.      All other systems reviewed and negative except as noted above.    Physical Exam     ED Triage Vitals [09/05/24 1641]   /65   Pulse 69   Resp 18   Temp 97.1 °F (36.2 °C)   Temp src Temporal   SpO2 98 %   O2 Device None (Room air)       Current Vitals:   Vital Signs  BP: 102/73  Pulse: 60  Resp: 18  Temp: 97.1 °F (36.2 °C)  Temp src: Temporal  MAP (mmHg): 83    Oxygen Therapy  SpO2: 100 %  O2 Device: None (Room air)            Physical Exam        Physical Exam  Vitals signs and nursing note reviewed.   General:  Patient laying supine in the bed in no acute distress  Head: Normocephalic and atraumatic.   HEENT:  Mucous membranes are moist.    Cardiovascular:  Normal rate and regular rhythm.  No Edema DP and PT pulses 2+ bilaterally.  Pulmonary:  Pulmonary effort is normal.  Normal breath sounds. No wheezing, rhonchi or rales.   Abdominal: Soft nontender nondistended, normal bowel sounds, no guarding no rebound tenderness  Skin: Warm and dry  Neurological: Awake alert, speech is normal        ED Course     Labs Reviewed   COMP METABOLIC PANEL (14) - Abnormal; Notable for the following components:       Result Value    Globulin  3.6 (*)     All other components within normal limits   CBC WITH DIFFERENTIAL WITH PLATELET - Abnormal; Notable for the following components:    .0 (*)     All other components within normal limits   D-DIMER - Abnormal; Notable for the following components:    D-Dimer 1.13 (*)     All other components within normal limits   TROPONIN I HIGH SENSITIVITY - Normal   POCT PREGNANCY URINE - Normal   RAINBOW DRAW BLUE     EKG    Rate, intervals and axes as noted on EKG Report.  Rate: 66  Rhythm: Sinus Rhythm  Reading: No acute ischemic changes              CT CHEST PE AORTA (IV ONLY) (CPT=71260)    Result Date: 9/5/2024  PROCEDURE:  CT CHEST PE AORTA (IV ONLY) (CPT=71260)  COMPARISON:  [facility] , CT, CT ANGIOGRAPHY, CHEST (CPT=71275), 7/24/2023, 5:47 PM.  INDICATIONS:  leg pain r/o DVT  TECHNIQUE:  CT images were obtained with non-ionic intravenous contrast material. Dose reduction techniques were used. Dose information is transmitted to the ACR (American College of Radiology) NRDR (National Radiology Data Registry) which includes the Dose Index Registry.  PATIENT STATED HISTORY:(As transcribed by Technologist)  left leg pain, chest pain, sob.   CONTRAST USED:  100 mLcc of Isovue 370  FINDINGS:   VASCULATURE:  Negative for acute pulmonary embolism.  No filling defects are seen centrally or peripherally within the pulmonary arterial system.  LUNGS/PLEURA:  No acute process.  No sign of pneumonia, effusion, pneumothorax, ground-glass  disease.  THORACIC AORTA:  No thoracic aortic aneurysm.  MEDIASTINUM/LAMONTE:  No pathologically enlarged nodes.  CARDIAC:  Unremarkable.  CHEST WALL:  Unremarkable.  LIMITED ABDOMEN:  No acute process seen.  BONES:  No acute process seen.  OTHER:  Enlarged heterogeneous thyroid multinodular redemonstrated likely goiter, can be followed up clinically and with ultrasound imaging if needed.             CONCLUSION:  No sign of acute pulmonary embolism, pneumonia, effusion or other acute process.  Chronic heterogeneous thyroid enlargement as above.   LOCATION:  [id number]   Dictated by (CST): Wicho Mishra MD on 9/05/2024 at 8:30 PM     Finalized by (CST): Wicho Mishra MD on 9/05/2024 at 8:32 PM       US VENOUS DOPPLER LEG LEFT - DIAG IMG (CPT=93971)    Result Date: 9/5/2024  PROCEDURE:  US VENOUS DOPPLER LEG LEFT - DIAG IMG (CPT=93971)  COMPARISON:  None.  INDICATIONS:  Left leg pain.  TECHNIQUE:  Real time, grey scale, and duplex ultrasound was used to evaluate the lower extremity venous system. B-mode two-dimensional images of the vascular structures, Doppler spectral analysis, and color flow.  Doppler imaging were performed.  The following veins were imaged:  Common, deep, and superficial femoral, popliteal, sapheno-femoral junction, posterior tibial veins, and the contralateral common femoral vein.  PATIENT STATED HISTORY: (As transcribed by Technologist)     FINDINGS:  EXTREMITY EXAMINED:  Left lower extremity. SAPHENOFEMORAL JUNCTION:  No reflux. THROMBI:  None visible. COMPRESSION:  Normal compressibility, phasicity, and augmentation. OTHER:  Negative.            CONCLUSION:  Negative exam, no evidence of left lower extremity DVT.   LOCATION:  [facility]    Dictated by (CST): Maureen Moreira DO on 9/05/2024 at 7:05 PM     Finalized by (CST): Maureen Moreira DO on 9/05/2024 at 7:06 PM       XR CHEST AP PORTABLE  (CPT=71045)    Result Date: 9/5/2024            PROCEDURE:  XR CHEST AP PORTABLE  (CPT=71045)  TECHNIQUE:  AP  chest radiograph was obtained.  COMPARISON:  [facility] , XR, XR CHEST AP PORTABLE  (CPT=71045), 7/24/2023, 2:28 PM.  INDICATIONS:  Blood clot, pain  PATIENT STATED HISTORY: (As transcribed by Technologist)  Patient states pain in left leg since Monday, states possible blood clot in left leg.    .     Pain involving the extremity, after injury.    LOCATION:  [facility]      Dictated by (CST): Wicho Mishra MD on 9/05/2024 at 5:40 PM     Finalized by (CST): Wicho Mishra MD on 9/05/2024 at 5:41 PM               MDM      This is a 44-year-old woman here for evaluation of leg discomfort chest discomfort.  Differential for the leg discomfort includes acute DVT, musculoskeletal discomfort tendinitis muscle strain.  She has no focal bony tenderness on exam and ultrasound left lower extremity shows no acute abnormality.  She does seem to be tender along the Achilles tendon, perhaps tendinitis.  Her Achilles tendon appears to be intact.  Discussed with patient, believe she may likely have tendinitis, versus strain recommend ibuprofen as needed, limit vigorous exercise prolonged walking.  Noted her chest discomfort her EKG is no acute ischemic changes troponin is negative which is reassuring given the duration of her symptoms a D-dimer was sent from triage, this led to a CTA of the chest showing no acute pulmonary embolism or any other acute abnormality.  Patient states she believes her symptoms are simply reflux and GERD symptoms which she has had for quite some time was taking pantoprazole previously which she states seemed to have helped.  No cardiac risk factors no exertional symptoms no recent change in her exercise tolerance recommend she start PPI, follow-up with primary care for further evaluation, return precautions discussed she is in agreement with plan.                               Medical Decision Making      Disposition and Plan     Clinical Impression:  1. Chest pain, atypical    2. Pain of left lower extremity          Disposition:  Discharge  9/5/2024  9:00 pm    Follow-up:  Christal Mahmood MD  [address] [place] [address] [id number] [phone]    Follow up  Follow-up with your primary doctor or at the clinic listed for further evaluation.  Return to the emergency department immediately if your symptoms do not continue to improve or if you have any new concerning symptoms.          Medications Prescribed:  Discharge Medication List as of 9/5/2024  9:04 PM        START taking these medications    Details   pantoprazole 20 MG Oral Tab EC Take 1 tablet (20 mg total) by mouth daily., Normal, Disp-30 tablet, R-0

## 2025-06-17 NOTE — ED INITIAL ASSESSMENT (HPI)
Heavy  book fell on her back of the head and neck yesterday . Now complaining of blurred vision . Difficulty in concentrating  and feeling very weak . Patient is ambulatory with steady gait .

## 2025-06-18 NOTE — ED PROVIDER NOTES
Patient Seen in: [facility] Emergency Department        History  Chief Complaint   Patient presents with    Head Neck Injury     Stated Complaint: hit in the back of the head with a book yesterday.    Subjective:   HPI            45-year-old female presents with head injury.  She was cleaning around her house yesterday and a book fell off the shelf and hit her on the back of the head.  No LOC.  No bleeding.  Says she felt a little woozy afterwards and throughout the rest of the day.  Today she continues to feel \"woozy\" and like she is having a harder time \"putting things together\" and that her vision is \" different\".  No diplopia.  No visual field cuts.  No focal weakness or numbness or tingling.      Objective:     History reviewed. No pertinent past medical history.           Past Surgical History:   Procedure Laterality Date    Other surgical history      ASD repair at age 6                Social History     Socioeconomic History    Marital status:    Tobacco Use    Smoking status: Never    Smokeless tobacco: Never   Vaping Use    Vaping status: Never Used   Substance and Sexual Activity    Alcohol use: Yes     Comment: socially    Drug use: Never    Sexual activity: Yes     Partners: Female     Birth control/protection: Vasectomy   Other Topics Concern    Caffeine Concern No    Exercise Yes    Seat Belt Yes     Social Drivers of Health     Food Insecurity: No Food Insecurity (5/4/2020)    Received from [facility]    Hunger Vital Sign     Worried About Running Out of Food in the Last Year: Never true     Ran Out of Food in the Last Year: Never true    Received from [facility]    Housing Stability                                Physical Exam    ED Triage Vitals [06/17/25 9679]   /81   Pulse 70   Resp 18   Temp 98.7 °F (37.1 °C)   Temp src Temporal   SpO2 98 %   O2 Device None (Room air)       Current Vitals:   Vital Signs  BP: 100/61  Pulse: 74  Resp:  17  Temp: 98.7 °F (37.1 °C)  Temp src: Temporal  MAP (mmHg): 73    Oxygen Therapy  SpO2: 99 %  O2 Device: None (Room air)            Physical Exam  Constitutional:       General: is not in acute distress.     Appearance: is not ill-appearing.   HENT:      Head: Normocephalic and atraumatic.  No occipital hematoma or depressed skull fracture     Nose: Nose normal.      Mouth/Throat:      Mouth: Mucous membranes are moist.   Eyes:      Conjunctiva/sclera: Conjunctivae normal.      Pupils: Pupils are equal, round, and reactive to light.   Cardiovascular:      Rate and Rhythm: Normal rate and regular rhythm.   Pulmonary:      Effort: Pulmonary effort is normal. No respiratory distress.   Abdominal:      General: Abdomen is flat. There is no distension.   Musculoskeletal:      Cervical back: Neck supple.      Right lower leg: No edema.      Left lower leg: No edema.   Neurological:      Mental Status: Is alert and oriented to person, place, and time.      Cranial Nerves: No cranial nerve deficit.      Sensory: No sensory deficit.      Motor: No weakness.      Comments: FNF wnl, normal gait and tandem gait, negative Romberg             ED Course  Labs Reviewed - No data to display                         MDM     Considered all emergent etiologies, differential includes but is not limited to: ICH, skull fracture, postconcussive symptom     I have independently visualized the radiology images, my focus/limited interpretation: N/A     Defer to radiologist for other/incidental findings    Very minor head injury with exceptionally low suspicion for serious intracranial hemorrhage or skull fracture.  Low risk by [place] head CT rule.  Discussed options including CT scan today for definitive evaluation versus home with return precautions and outpatient follow-up.  Patient is comfortable going home without a CT given the low risk nature of her evaluation.  Discussed red flags and return precautions at length        Medical  Decision Making      Disposition and Plan     Clinical Impression:  1. Injury of head, initial encounter         Disposition:  Discharge  6/17/2025  7:28 pm    Follow-up:  Trisha Collado MD  [address] [place] [address] [id number] [phone]    Follow up            Medications Prescribed:  Current Discharge Medication List                Supplementary Documentation:

## 2025-06-27 NOTE — ED INITIAL ASSESSMENT (HPI)
Pt was seen here last week for head injury and now having nausea and headache. No neuro deficits noted.

## 2025-06-27 NOTE — ED PROVIDER NOTES
Patient Seen in: [facility] Emergency Department        History  Chief Complaint   Patient presents with    Headache     Stated Complaint: hit head last monday was seen thursday, still having headaches    Subjective:   HPI            Patient is a 45-year-old female who presents with persistent symptoms of headache, nausea, difficulty focusing, lightheadedness.  10 days ago she was struck in the back of her head by a book that fell.  She was actually kneeling on the floor and it fell off a somewhat high shelf and hit her.  Did not lose consciousness.  She was seen here a week ago today where imaging was discussed but deferred.  However she reports symptoms continued so she returns for evaluation.      Objective:     History reviewed. No pertinent past medical history.           Past Surgical History:   Procedure Laterality Date    Other surgical history      ASD repair at age 6                Social History     Socioeconomic History    Marital status:    Tobacco Use    Smoking status: Never    Smokeless tobacco: Never   Vaping Use    Vaping status: Never Used   Substance and Sexual Activity    Alcohol use: Yes     Comment: socially    Drug use: Never    Sexual activity: Yes     Partners: Female     Birth control/protection: Vasectomy   Other Topics Concern    Caffeine Concern No    Exercise Yes    Seat Belt Yes     Social Drivers of Health     Food Insecurity: No Food Insecurity (5/4/2020)    Received from [facility]    Hunger Vital Sign     Worried About Running Out of Food in the Last Year: Never true     Ran Out of Food in the Last Year: Never true    Received from [facility]    Housing Stability                                Physical Exam    ED Triage Vitals [06/26/25 2042]   /72   Pulse 67   Resp 18   Temp 98.4 °F (36.9 °C)   Temp src Temporal   SpO2 98 %   O2 Device None (Room air)       Current Vitals:   Vital Signs  BP: 122/72  Pulse: 67  Resp:  18  Temp: 98.4 °F (36.9 °C)  Temp src: Temporal    Oxygen Therapy  SpO2: 98 %  O2 Device: None (Room air)            Physical Exam  Vitals and nursing note reviewed.   Constitutional:       Appearance: She is well-developed.   HENT:      Head: Normocephalic and atraumatic.   Eyes:      Conjunctiva/sclera: Conjunctivae normal.      Pupils: Pupils are equal, round, and reactive to light.   Cardiovascular:      Rate and Rhythm: Normal rate and regular rhythm.      Heart sounds: Normal heart sounds.   Pulmonary:      Effort: Pulmonary effort is normal.      Breath sounds: Normal breath sounds.   Abdominal:      General: Bowel sounds are normal.      Palpations: Abdomen is soft.   Musculoskeletal:         General: Normal range of motion.   Skin:     General: Skin is warm and dry.   Neurological:      Mental Status: She is alert and oriented to person, place, and time.                 ED Course  Labs Reviewed - No data to display       CT BRAIN OR HEAD (CPT=70450)  Result Date: 6/26/2025  PROCEDURE: CT BRAIN OR HEAD (CPT=70450) INDICATIONS:  Head injury last week, persistent headaches COMPARISON: There are no comparisons for this exam. TECHNIQUE: Noncontrast CT scanning is performed through the brain. Dose reduction techniques were used. Dose information is transmitted to the ACR (American College of Radiology) NRDR (National Radiology Data Registry) which includes the Dose Index Registry FINDINGS: VENTRICLES/SULCI: Ventricles and sulci are normal in size. INTRACRANIAL: There are no abnormal extraaxial fluid collections.  There is no midline shift.  There are no intraparenchymal brain abnormalities.  There is nothing specific for acute infarct.  There is no hemorrhage or mass lesion. SINUSES: No sign of acute sinusitis. MASTOIDS: No sign of acute inflammation. SKULL: No evidence for fracture or osseous abnormality. OTHER: None.     CONCLUSION: No significant midline shift or mass effect. No acute intracranial hemorrhage.  If there is persistent clinical concern, then consider MRI. Electronically Verified and Signed by Attending Radiologist: Jeff Almazan MD 6/26/2025 10:04 PM Workstation: [id number]                      [facility]     45-year-old female presenting with persistent headache, lightheadedness, nausea and difficulty focusing after head injury 10 days ago as detailed above.  She is awake and alert.  Not in any distress.  Light of the persistent symptoms we will send her for a CT to rule out ICH or skull fracture.    Update at 10:20 PM.  CT brain is unremarkable.  Overall consistent with concussion.  Discussed results.  Patient is comfortable going home.      Past Medical History-none    Differential diagnosis before testing included ICH, skull fracture, concussion    Co-morbidities that add to the complexity of management include: None    Testing ordered during this visit included CT brain    Radiographic images  I personally reviewed the radiographs and my individual interpretation shows no ICH or skull fracture  I also reviewed the official reports that showed no ICH or skull fracture      Medications Provided: Zofran            Disposition:    Admission      Discharge  I have discussed with the patient the results of test, differential diagnosis, treatment plan, warning signs and symptoms which should prompt immediate return.  They expressed understanding of these instructions and agrees to the following plan provided.  They were given written discharge instructions and agrees to return for any concerns and voiced understanding and all questions were answered.      Medical Decision Making      Disposition and Plan     Clinical Impression:  1. Concussion without loss of consciousness, initial encounter         Disposition:  Discharge  6/26/2025 10:35 pm    Follow-up:  Rachel Chaudhari DO  [address] [place] [id number]  [phone]    Follow up            Medications Prescribed:  Current Discharge Medication List         START taking these medications    Details   ondansetron 4 MG Oral Tablet Dispersible Take 1 tablet (4 mg total) by mouth every 4 (four) hours as needed for Nausea.  Qty: 10 tablet, Refills: 0                   Supplementary Documentation:

## (undated) NOTE — Clinical Note
Be on the lookout for result notes this AM; also want to add on (in case I forget) that I changed her utlrasound from routine to STAT so that if she calls to schedule it she is given priority to get it evaluated sooner.  You can wait to tell her that when you call with results

## (undated) NOTE — LETTER
June 26, 2025    Patient: Mary Batista   Date of Visit: 6/26/2025       To Whom It May Concern:    Mary Batista was seen and treated in our emergency department on 6/26/2025. She should not return to work until 6/30.    If you have any questions or concerns, please don't hesitate to call.       Encounter Provider(s):    Isidro Garcia MD